# Patient Record
Sex: FEMALE | Race: WHITE
[De-identification: names, ages, dates, MRNs, and addresses within clinical notes are randomized per-mention and may not be internally consistent; named-entity substitution may affect disease eponyms.]

---

## 2017-12-14 ENCOUNTER — HOSPITAL ENCOUNTER (OUTPATIENT)
Dept: HOSPITAL 45 - C.MAMM | Age: 52
Discharge: HOME | End: 2017-12-14
Attending: INTERNAL MEDICINE
Payer: COMMERCIAL

## 2017-12-14 DIAGNOSIS — N63.10: ICD-10-CM

## 2017-12-14 DIAGNOSIS — Z12.31: Primary | ICD-10-CM

## 2017-12-15 NOTE — MAMMOGRAPHY REPORT
BILATERAL DIGITAL SCREENING MAMMOGRAM TOMOSYNTHESIS WITH CAD: 12/14/2017

CLINICAL HISTORY: Routine screening.  Patient has no complaints.  





TECHNIQUE:  Breast tomosynthesis in addition to standard 2D mammography was performed. Current study 
was also evaluated with a Computer Aided Detection (CAD) system.  



COMPARISON: Comparison is made to exams dated:  5/21/2014 mammogram - [facility], 3
/18/2008, and 5/6/2005 mammogram - [facility].   



BREAST COMPOSITION:  The tissue of both breasts is heterogeneously dense, which may obscure small mas
ses.  



FINDINGS:  There is a circumscribed oval 8 mm mass within the right superior posterior breast overlyi
ng the pectoralis muscle on the MLO view, not clearly evident on the cc view but thought to project l
aterally based on the tomosynthesis localizer bar.  Recommend spot compression tomosynthesis views, r
ight X CCL view, and possible breast ultrasound for further evaluation.



The remainder of both breasts are stable compared to prior exams, without suspicious masses, calcific
ations, or areas of architectural distortion noted.  Scattered bilateral benign-appearing calcificati
ons are not significantly changed.  Asymmetries in the right superior breast on the MLO view and righ
t lateral breast on the cc view are stable compared to prior exams.





IMPRESSION:  ACR BI-RADS CATEGORY 0: INCOMPLETE EVALUATION:  NEED ADDITIONAL IMAGING EVALUATION

Right breast mass, for which additional imaging evaluation is recommended.  The patient will be bowers
d to schedule an appointment.  





Approximately 10% of breast cancers are not detected with mammography. A negative mammographic report
 should not delay biopsy if a clinically suggestive mass is present.



Ramonita Oswald M.D.          

/:12/14/2017 16:13:31  



Imaging Technologist: Nicole Saldaña [facility]

letter sent: Addl Imaging 0  

BI-RADS Code: ACR BI-RADS Category 0: Incomplete Evaluation:  Need Additional Imaging Evaluation

## 2017-12-28 ENCOUNTER — HOSPITAL ENCOUNTER (OUTPATIENT)
Dept: HOSPITAL 45 - C.MAMM | Age: 52
Discharge: HOME | End: 2017-12-28
Attending: INTERNAL MEDICINE
Payer: COMMERCIAL

## 2017-12-28 DIAGNOSIS — N63.11: Primary | ICD-10-CM

## 2017-12-28 NOTE — MAMMOGRAPHY REPORT
UNILATERAL RIGHT DIGITAL DIAGNOSTIC MAMMOGRAM TOMOSYNTHESIS AND TARGETED RIGHT ULTRASOUND: 12/28/2017


CLINICAL HISTORY: Callback from screening mammogram for right breast mass.  The patient reports her s
ister was recently diagnosed with stage IV breast cancer.  





TECHNIQUE:  Breast tomosynthesis in addition to standard 2D mammography was performed.  Spot compress
ion right MLO and full right X CCL tomosynthesis images including C views were obtained.



COMPARISON: Comparison is made to exams dated:  12/14/2017 mammogram, 5/21/2014 mammogram - [facility], and 3/18/2008.   



BREAST COMPOSITION:  The tissue of the right breast is heterogeneously dense, which may obscure small
 masses.  



FINDINGS: Spot compression views demonstrate an oval circumscribed 9 mm mass within the right upper o
uter quadrant posteriorly.  In retrospect, this mass is increased in size compared to the MLO view fr
om the 2014 exam where the mass measured 6 mm.



Targeted ultrasound was performed of the right breast in the region of the mammographic mass.  In the
 right breast at 8:30, 7 cm from the nipple, there is an oval circumscribed 6 x 7 x 5 mm mass.  An ec
hogenic fatty hilum and associated central internal vascularity is seen, suggestive of an intramammar
y lymph node.  The peripheral hypoechoic cortex is mildly thickened on ultrasound.  This corresponds 
with the increasing mammographic mass.  Given the interval change mammographically and given the slig
htly thickened cortex on ultrasound, ultrasound-guided biopsy is recommended for further evaluation.





IMPRESSION:  ACR BI-RADS CATEGORY 4: SUSPICIOUS, TARGETED ULTRASOUND ACR BI-RADS CATEGORY 4: SUSPICIO
US 

Circumscribed 9 mm mass within the right upper outer quadrant is increased in size mammographically c
ompared to the 2014 exam.  The mass has features of an intramammary lymph node on ultrasound although
 the cortex appears mildly thickened.  Given the interval mammographic change and given the slightly 
thickened cortex on ultrasound, ultrasound-guided core needle biopsy is recommended for further evalu
ation.



A phone call was made to the physician's office to confirm faxed results were received.  The patient 
has been verbally notified of the results.  She tentatively scheduled the biopsy before leaving the [facility].





Approximately 10% of breast cancers are not detected with mammography. A negative mammographic report
 should not delay biopsy if a clinically suggestive mass is present.



Ramonita Oswald M.D.          

ah/:12/28/2017 10:57:12  



Imaging Technologist: Shaniqua INMAN)DUNG), [facility]

letter sent: Abnormal 4/5  

BI-RADS Code: ACR BI-RADS Category 4: Suspicious  Ultrasound BI-RADS: ACR BI-RADS Category 4: Suspici
ous

## 2018-01-03 ENCOUNTER — HOSPITAL ENCOUNTER (OUTPATIENT)
Dept: HOSPITAL 45 - C.MAMM | Age: 53
Discharge: HOME | End: 2018-01-03
Attending: NURSE PRACTITIONER
Payer: COMMERCIAL

## 2018-01-03 DIAGNOSIS — D47.9: ICD-10-CM

## 2018-01-03 DIAGNOSIS — N63.10: Primary | ICD-10-CM

## 2018-01-03 NOTE — MAMMOGRAPHY REPORT
UNILATERAL RIGHT DIGITAL DIAGNOSTIC MAMMOGRAM TOMOSYNTHESIS: 1/3/2018

CLINICAL HISTORY: Status post right breast biopsy.  





TECHNIQUE:  Breast tomosynthesis in addition to standard 2D mammography was performed.  Postprocedura
l right MLO and XCCL tomosynthesis images including C views were obtained.



COMPARISON: Comparison is made to exams dated:  12/28/2017 ultrasound, 12/28/2017 mammogram, 12/14/20
17 mammogram, and 5/21/2014 mammogram - [facility].   



BREAST COMPOSITION:  The tissue of the right breast is heterogeneously dense, which may obscure small
 masses.  



FINDINGS:  A new ribbon-shaped biopsy marker clip is seen within the biopsied intramammary lymph node
 in the right upper outer quadrant posteriorly.  No significant postbiopsy hematoma is seen.





IMPRESSION:  POST PROCEDURE IMAGING FOR MARKER PLACEMENT

New biopsy marker clip status post right breast ultrasound-guided biopsy.  Pathology results are pend
ing.





Approximately 10% of breast cancers are not detected with mammography. A negative mammographic report
 should not delay biopsy if a clinically suggestive mass is present.



Ramonita Oswald M.D.          

ah/:1/3/2018 08:33:58  



Imaging Technologist: Rayna DUGGAN(ALBERT)(M), [facility]



BI-RADS Code: Post Procedure Imaging For Marker Placement

## 2018-01-03 NOTE — DISCHARGE INSTRUCTIONS
Discharge Instructions


Procedure


Procedure Date:


Triston 3, 2018.


Reason for visit:


Right Mass.





Discharge


Discharge Date:


Triston 3, 2018.


Discharge Diagnosis:


status post breast biopsy





Instructions


Activity Recommendations:  Additional Limitations (see below)


Return to School/Work:  no limitations


Recommended Home Diet:  No Limitations


Provider Instructions:





ACTIVITY RECOMMENDATIONS:





*  No lifting, pushing, pulling or exercising the affected side for three days.








RETURN TO SCHOOL/WORK:





*  You may return to work/school after the procedure, but do not perform any 

strenuous


   activities for 24 to 48 hours.








MEDICATIONS:





*  Tylenol (two 325 mg) every four to six hours if needed for mild pain (if not 

allergic to Tylenol).








DIET:





*  Resume previous diet.








SPECIAL CARE INSTRUCTIONS:





*  Keep biopsy site dry for 24 hours.  May shower after 24 hours, but do not 

soak (bathe)


   incision.





*  May remove Tegaderm (plastic patch) tomorrow AFTER showering.





*  Leave the steri-strips on for one week.  Allow the steri-strips to fall off 

by themselves.  


   If not off after one week, you may remove them.  You may place a Bandaid


   crosswise over the strips, if desired.





*  Apply ice 10 minutes on and 10 minutes off as needed.





*  Wear a bra at bedtime to sleep more comfortably for 2-3 days.





*  Your referring physician should have the results after approximately 5 to 7 

business days.





*  Call for unusual bleeding, fever, drainage, etc or if you have any questions 

call


    [phone] during normal business hours or after hours call Dr Oswald, [phone].








FOLLOW UP VISIT:





Follow-up with Referring Physician as scheduled.





Allergies


Coded Allergies:  


     Ciprofloxacin (Verified  Allergy, Unknown, STATED "MESSED UP MY ACHILLES 

TENDON,I HAD REALLY NASTY PAIN, 11/25/11)


     Penicillins (Verified  Allergy, Unknown, ., 11/25/11)





Lalit Solis Recommendations:


 


Call your doctor if:





*  Temperature above 101 degrees


*  Pain not relieved by pain medicine ordered


*  There is increased drainage or redness from any incision


*  You have any unanswered questions or concerns.





Your Doctors Instructions noted above were prepared by provider Ramonita Oswald.


Patient Signature Section:





 Patient Instructions Signature Page














Madyson Gandara 











Patient (or Guardian) Signature/Date:____________________________________ I 

have read and understand the instructions given to me by my caregivers.








Caregiver/RN/Doctor Signature/Date:____________________________________











The above-named patient and/or guardian has received patient instructions on 

this date.





























+  Original Patient Signature Page (only) stays with chart.  Please make copy 

for patient.

## 2018-01-03 NOTE — MAMMOGRAPHY REPORT
ULTRASOUND GUIDED BIOPSY RIGHT BREAST: 1/3/2018

CLINICAL HISTORY: Prominent intramammary lymph node in the right 8:30 breast.  



PATIENT CONSENT: The procedure and risks were discussed with the patient and informed written consent
 was obtained. A timeout was performed immediately prior to the procedure.    



PROCEDURE DESCRIPTION: With ultrasound guidance, aseptic technique, and lidocaine as the local anesth
etic (1% lidocaine to anesthetize the skin and 1% lidocaine with epinephrine to anesthetize the deepe
r tissues), the mass of concern in the right 8:30 breast was sampled 5 times with a 14-gauge Achieve 
biopsy needle.  Immediately thereafter, with ultrasound guidance, aseptic technique, and lidocaine as
 the local anesthetic, a metallic localizer clip was placed centrally in the mass.  Direct pressure w
as applied to the site immediately post procedure and hemostasis was achieved.  Postprocedure unilate
ral mammograms were performed to confirm placement of the clip in the expected location of the breast
 mass.  The patient tolerated the procedure without complication.  She was given wound care instructi
ons. The specimens were sent to pathology for analysis.  





COMPARISON: Comparison is made to exams dated:  12/28/2017 ultrasound, 12/28/2017 mammogram, 12/14/20
17 mammogram, 5/21/2014 mammogram - [facility], 3/18/2008, and 5/6/2005 mammogram -
 [facility].   









IMPRESSION: ULTRASOUND GUIDED BIOPSY

Ultrasound guided core needle biopsy of the prominent intramammary lymph node in the right 8:30 breas
t, with clip placement.  The patient will receive pathology results from her referring provider.



Ramonita Oswald M.D.  

/:1/3/2018 08:29:28  



Imaging Technologist: Rayna INMAN)(SHARMIN), [facility]

## 2018-01-12 ENCOUNTER — HOSPITAL ENCOUNTER (OUTPATIENT)
Dept: HOSPITAL 45 - C.CTS | Age: 53
Discharge: HOME | End: 2018-01-12
Attending: PHYSICIAN ASSISTANT
Payer: COMMERCIAL

## 2018-01-12 DIAGNOSIS — C85.99: ICD-10-CM

## 2018-01-12 DIAGNOSIS — E78.5: ICD-10-CM

## 2018-01-12 DIAGNOSIS — R07.9: ICD-10-CM

## 2018-01-12 DIAGNOSIS — Z00.00: Primary | ICD-10-CM

## 2018-01-12 LAB
ALBUMIN SERPL-MCNC: 4.1 GM/DL (ref 3.4–5)
ALP SERPL-CCNC: 95 U/L (ref 45–117)
ALT SERPL-CCNC: 32 U/L (ref 12–78)
AST SERPL-CCNC: 20 U/L (ref 15–37)
BASOPHILS # BLD: 0.02 K/UL (ref 0–0.2)
BASOPHILS NFR BLD: 0.2 %
BUN SERPL-MCNC: 15 MG/DL (ref 7–18)
CALCIUM SERPL-MCNC: 9.5 MG/DL (ref 8.5–10.1)
CO2 SERPL-SCNC: 30 MMOL/L (ref 21–32)
CREAT SERPL-MCNC: 0.84 MG/DL (ref 0.6–1.2)
EOS ABS #: 0.08 K/UL (ref 0–0.5)
EOSINOPHIL NFR BLD AUTO: 305 K/UL (ref 130–400)
GLUCOSE SERPL-MCNC: 95 MG/DL (ref 70–99)
HCT VFR BLD CALC: 43.5 % (ref 37–47)
HGB BLD-MCNC: 14.7 G/DL (ref 12–16)
IG#: 0.01 K/UL (ref 0–0.02)
IMM GRANULOCYTES NFR BLD AUTO: 33.6 %
KETONES UR QL STRIP: 166 MG/DL
LYMPHOCYTES # BLD: 3.1 K/UL (ref 1.2–3.4)
MCH RBC QN AUTO: 31.3 PG (ref 25–34)
MCHC RBC AUTO-ENTMCNC: 33.8 G/DL (ref 32–36)
MCV RBC AUTO: 92.8 FL (ref 80–100)
MONO ABS #: 0.51 K/UL (ref 0.11–0.59)
MONOCYTES NFR BLD: 5.5 %
NEUT ABS #: 5.5 K/UL (ref 1.4–6.5)
NEUTROPHILS # BLD AUTO: 0.9 %
NEUTROPHILS NFR BLD AUTO: 59.7 %
PH UR: 270 MG/DL (ref 0–200)
PMV BLD AUTO: 10.7 FL (ref 7.4–10.4)
POTASSIUM SERPL-SCNC: 4.4 MMOL/L (ref 3.5–5.1)
PROT SERPL-MCNC: 7.7 GM/DL (ref 6.4–8.2)
RED CELL DISTRIBUTION WIDTH CV: 13.2 % (ref 11.5–14.5)
RED CELL DISTRIBUTION WIDTH SD: 44.4 FL (ref 36.4–46.3)
SODIUM SERPL-SCNC: 137 MMOL/L (ref 136–145)
WBC # BLD AUTO: 9.22 K/UL (ref 4.8–10.8)

## 2018-01-12 NOTE — DIAGNOSTIC IMAGING REPORT
CT SCAN OF THE CHEST WITH IV CONTRAST



CLINICAL HISTORY: Atypical chest pain. Reported history of lymphoma.



COMPARISON STUDY:  Chest CT dated 6/29/2015.



TECHNIQUE: Following the IV administration of 83 cc of Optiray 320, CT scan of

the thorax was performed from the thoracic inlet to the upper abdomen. Images

are reviewed in the axial, sagittal, and coronal planes. IV contrast was

administered without complication.  A dose lowering technique was utilized

adhering to the principles of ALARA.



CT DOSE: 181.31 mGycm



FINDINGS:



Thyroid: Imaged portions of the thyroid gland are normal in size and

attenuation.



Thoracic aorta: The thoracic aorta is normal in caliber and demonstrates

standard 3-vessel arch anatomy. No dissection is seen.



Pulmonary vasculature: The pulmonary trunk is normal in caliber. There are no

filling defects identified in the central pulmonary vessels to indicate

pulmonary embolus. Note that this examination was not protocoled for evaluation

of the pulmonary arteries.



Heart: The heart is normal in size and configuration, and without pericardial

effusion.



Lungs and pleural spaces: The lungs and pleural spaces are clear.



Mediastinum: There is no mediastinal lymphadenopathy.



Amy: Clear.



Axillae: There is no axillary lymphadenopathy.



Upper abdomen: An 11 mm hypodensity in the spleen is indeterminant but

statistically of doubtful significance. Partially visualized upper abdominal

viscera is otherwise within normal limits.



Skeletal structures: The skeletal structures are osteopenic. No lytic or blastic

bony lesions are seen.





IMPRESSION:



1. There is no acute intrathoracic abnormality.



2. The lungs are clear.



3. No adenopathy is identified.



4. Additional findings as above.







Electronically signed by:  Calvin Lundberg M.D.

1/12/2018 11:00 AM



Dictated Date/Time:  1/12/2018 10:57 AM

## 2018-02-09 ENCOUNTER — HOSPITAL ENCOUNTER (OUTPATIENT)
Dept: HOSPITAL 45 - C.CTS | Age: 53
Discharge: HOME | End: 2018-02-09
Attending: INTERNAL MEDICINE
Payer: COMMERCIAL

## 2018-02-09 DIAGNOSIS — D73.89: ICD-10-CM

## 2018-02-09 DIAGNOSIS — D25.9: ICD-10-CM

## 2018-02-09 DIAGNOSIS — C83.04: Primary | ICD-10-CM

## 2018-02-09 NOTE — DIAGNOSTIC IMAGING REPORT
CT SCAN OF THE ABDOMEN AND PELVIS WITH IV CONTRAST



CLINICAL HISTORY:   CLL.



COMPARISON STUDY:  No priors.



TECHNIQUE: Following the IV administration of  94 cc of Optiray 320, CT scan of

the abdomen and pelvis is performed from the lung bases to the proximal femora.

Images are reviewed in the axial, sagittal, and coronal planes. IV contrast was

administered without complication. A dose lowering technique was utilized

adhering to the principles of ALARA. 



CT DOSE: 824.97 mGycm



FINDINGS:



Lung bases: The heart is normal in size and without pericardial effusion. The

lung bases are clear noting minimal dependent hypoventilatory change.



Liver: The contrast-enhanced liver is normal in size, contour, and attenuation.

There is no intrahepatic biliary ductal dilatation. The hepatic veins and portal

veins are patent.



Gallbladder: Unremarkable.



Spleen: Normal in size and attenuation, measuring 10.3 cm in length. An

indeterminant 10 mm splenic hypodensity seen on image #41.



Pancreas: Unremarkable.



Adrenal glands: Unremarkable.



Kidneys: The contrast enhanced kidneys are normal in size and without

hydronephrosis. The kidneys enhance symmetrically. Bilateral parapelvic cysts

are observed.



Abdominal vasculature: The abdominal aorta is normal in course and caliber.



Bowel: There is moderate colonic fecal retention. No bowel obstruction is seen.

The appendix is  well-visualized and normal.



Peritoneum: There is no intraperitoneal free air or abdominal ascites.



Lymphadenopathy: There is no upper abdominal, retroperitoneal, mesenteric,

pelvic sidewall, or inguinal lymphadenopathy.



Pelvic viscera: There are numerous uterine fibroids. The bladder is normal as

imaged. No adnexal lesion is seen.



Skeletal structures: No lytic or blastic lesions are seen. Mild lumbosacral

spondylosis is identified.





IMPRESSION:



1. There are no acute infectious or inflammatory findings in the abdomen or

pelvis.



2. No lymphadenopathy is seen in the abdomen or pelvis.



3. The spleen is normal in size.



4. A 10 mm splenic hypodensity is indeterminant but statistically of doubtful

significance.



5. Fibroid uterus.



6. Additional findings as above.







Electronically signed by:  Calvin Lundberg M.D.

2/9/2018 3:02 PM



Dictated Date/Time:  2/9/2018 2:57 PM

## 2018-02-12 ENCOUNTER — HOSPITAL ENCOUNTER (OUTPATIENT)
Dept: HOSPITAL 45 - C.LABBC | Age: 53
Discharge: HOME | End: 2018-02-12
Attending: INTERNAL MEDICINE
Payer: COMMERCIAL

## 2018-02-12 DIAGNOSIS — C83.04: Primary | ICD-10-CM

## 2018-02-12 LAB
ALBUMIN SERPL-MCNC: 4.2 GM/DL (ref 3.4–5)
ALP SERPL-CCNC: 86 U/L (ref 45–117)
ALT SERPL-CCNC: 24 U/L (ref 12–78)
AST SERPL-CCNC: 18 U/L (ref 15–37)
BASOPHILS # BLD: 0.02 K/UL (ref 0–0.2)
BASOPHILS NFR BLD: 0.2 %
BUN SERPL-MCNC: 19 MG/DL (ref 7–18)
CALCIUM SERPL-MCNC: 9.5 MG/DL (ref 8.5–10.1)
CO2 SERPL-SCNC: 31 MMOL/L (ref 21–32)
CREAT SERPL-MCNC: 0.87 MG/DL (ref 0.6–1.2)
EOS ABS #: 0.08 K/UL (ref 0–0.5)
EOSINOPHIL NFR BLD AUTO: 275 K/UL (ref 130–400)
GLUCOSE SERPL-MCNC: 84 MG/DL (ref 70–99)
HCT VFR BLD CALC: 41.2 % (ref 37–47)
HGB BLD-MCNC: 14.2 G/DL (ref 12–16)
IG#: 0.02 K/UL (ref 0–0.02)
IMM GRANULOCYTES NFR BLD AUTO: 33.6 %
LYMPHOCYTES # BLD: 3.1 K/UL (ref 1.2–3.4)
MCH RBC QN AUTO: 31.4 PG (ref 25–34)
MCHC RBC AUTO-ENTMCNC: 34.5 G/DL (ref 32–36)
MCV RBC AUTO: 91.2 FL (ref 80–100)
MONO ABS #: 0.47 K/UL (ref 0.11–0.59)
MONOCYTES NFR BLD: 5.1 %
NEUT ABS #: 5.54 K/UL (ref 1.4–6.5)
NEUTROPHILS # BLD AUTO: 0.9 %
NEUTROPHILS NFR BLD AUTO: 60 %
PMV BLD AUTO: 10.6 FL (ref 7.4–10.4)
POTASSIUM SERPL-SCNC: 3.9 MMOL/L (ref 3.5–5.1)
PROT SERPL-MCNC: 7.6 GM/DL (ref 6.4–8.2)
RED CELL DISTRIBUTION WIDTH CV: 13.1 % (ref 11.5–14.5)
RED CELL DISTRIBUTION WIDTH SD: 43.2 FL (ref 36.4–46.3)
SODIUM SERPL-SCNC: 138 MMOL/L (ref 136–145)
WBC # BLD AUTO: 9.23 K/UL (ref 4.8–10.8)

## 2018-03-26 ENCOUNTER — HOSPITAL ENCOUNTER (OUTPATIENT)
Dept: HOSPITAL 45 - C.PAPS | Age: 53
Discharge: HOME | End: 2018-03-26
Attending: OBSTETRICS & GYNECOLOGY
Payer: COMMERCIAL

## 2018-03-26 DIAGNOSIS — Z12.4: Primary | ICD-10-CM
